# Patient Record
Sex: FEMALE | Race: OTHER | HISPANIC OR LATINO | URBAN - METROPOLITAN AREA
[De-identification: names, ages, dates, MRNs, and addresses within clinical notes are randomized per-mention and may not be internally consistent; named-entity substitution may affect disease eponyms.]

---

## 2020-03-29 ENCOUNTER — EMERGENCY (EMERGENCY)
Facility: HOSPITAL | Age: 25
LOS: 1 days | Discharge: HOME | End: 2020-03-29
Attending: STUDENT IN AN ORGANIZED HEALTH CARE EDUCATION/TRAINING PROGRAM
Payer: MEDICAID

## 2020-03-29 VITALS
SYSTOLIC BLOOD PRESSURE: 109 MMHG | RESPIRATION RATE: 20 BRPM | DIASTOLIC BLOOD PRESSURE: 69 MMHG | HEART RATE: 109 BPM | OXYGEN SATURATION: 98 % | HEIGHT: 63 IN | WEIGHT: 134.04 LBS | TEMPERATURE: 99 F

## 2020-03-29 LAB — HCG UR QL: NEGATIVE — SIGNIFICANT CHANGE UP

## 2020-03-29 PROCEDURE — 99284 EMERGENCY DEPT VISIT MOD MDM: CPT

## 2020-03-29 PROCEDURE — 99283 EMERGENCY DEPT VISIT LOW MDM: CPT | Mod: 25

## 2020-03-29 PROCEDURE — 96372 THER/PROPH/DIAG INJ SC/IM: CPT

## 2020-03-29 PROCEDURE — 81025 URINE PREGNANCY TEST: CPT

## 2020-03-29 RX ORDER — LIDOCAINE 4 G/100G
1 CREAM TOPICAL ONCE
Refills: 0 | Status: COMPLETED | OUTPATIENT
Start: 2020-03-29 | End: 2020-03-29

## 2020-03-29 RX ORDER — METHOCARBAMOL 500 MG/1
1500 TABLET, FILM COATED ORAL ONCE
Refills: 0 | Status: COMPLETED | OUTPATIENT
Start: 2020-03-29 | End: 2020-03-29

## 2020-03-29 RX ORDER — KETOROLAC TROMETHAMINE 30 MG/ML
30 SYRINGE (ML) INJECTION ONCE
Refills: 0 | Status: DISCONTINUED | OUTPATIENT
Start: 2020-03-29 | End: 2020-03-29

## 2020-03-29 RX ADMIN — METHOCARBAMOL 1500 MILLIGRAM(S): 500 TABLET, FILM COATED ORAL at 15:45

## 2020-03-29 RX ADMIN — Medication 30 MILLIGRAM(S): at 15:44

## 2020-03-29 RX ADMIN — LIDOCAINE 1 PATCH: 4 CREAM TOPICAL at 15:44

## 2020-03-29 NOTE — ED PROVIDER NOTE - NS ED ROS FT
+ right sided lower back pain radiating down right leg   - abdominal pain   - changes in bowel movements   - dysuria

## 2020-03-29 NOTE — ED PROVIDER NOTE - OBJECTIVE STATEMENT
25 yo female on depo shot presenting to the ER with worsening lower back pain that radiates down her leg to her foot. states that she has been taking ibu with minimal relief of symptoms. states that walking increases pain. pain worse sometimes in the morning or sometimes at night. denies accidents or injuries no hx of back injuries. no bladder or bowel incontinence. denies dysuria hematuria or saddle anesthesia. patient denies abdominal pain nausea vomiting diarrhea or changes to bowel movements. mild uri symptoms no fevers at home.

## 2020-03-29 NOTE — ED PROVIDER NOTE - CLINICAL SUMMARY MEDICAL DECISION MAKING FREE TEXT BOX
female with lower back pain atraumatic radiating down the right side ambulatory with normal gait   meds dc with spine fu

## 2020-03-29 NOTE — ED PROVIDER NOTE - PHYSICAL EXAMINATION
nontoxic appearing female no apparent respiratory or physical distress   heart rrr no obvious murmurs. no calf tenderness bilaterally    lungs cta   abd soft nd nttp + healed scar to the right lower quadrants   spine no midline pt vertebral or step offs + bilateral lower paraspinal muscle tenderness skin without erythema bruising or lesions. + TTP over right gluteal muscle   able to straight leg raise and hold bilaterally ambulatory with normal gait

## 2020-03-29 NOTE — ED PROVIDER NOTE - PATIENT PORTAL LINK FT
You can access the FollowMyHealth Patient Portal offered by [facility] by registering at the following website: [email or web address]. By joining [employer]’s FollowMyHealth portal, you will also be able to view your health information using other applications (apps) compatible with our system.

## 2020-03-29 NOTE — ED PROVIDER NOTE - ATTENDING CONTRIBUTION TO CARE
I performed a face to face history and physical exam of the patient and discussed their management with the resident/ACP. I reviewed the resident/ACP's note and agree with the documented findings and plan of care.    Pt with low back pain radiating down leg.  no numbness/weakness. no urinary incontinence.  treated for sciatica. will d/c with outpatient f/up.

## 2020-03-29 NOTE — ED PROVIDER NOTE - NSFOLLOWUPCLINICS_GEN_ALL_ED_FT
[facility] - [facility]  Ortho/Spine  [address] [place] [id number]  Phone: [phone]  Fax:   Follow Up Time:

## 2023-02-01 ENCOUNTER — APPOINTMENT (OUTPATIENT)
Dept: ANTEPARTUM | Facility: CLINIC | Age: 28
End: 2023-02-01

## 2023-02-01 PROBLEM — Z00.00 ENCOUNTER FOR PREVENTIVE HEALTH EXAMINATION: Status: ACTIVE | Noted: 2023-02-01

## 2023-02-08 ENCOUNTER — ASOB RESULT (OUTPATIENT)
Age: 28
End: 2023-02-08

## 2023-02-08 ENCOUNTER — APPOINTMENT (OUTPATIENT)
Dept: ANTEPARTUM | Facility: CLINIC | Age: 28
End: 2023-02-08
Payer: MEDICAID

## 2023-02-08 PROCEDURE — 76830 TRANSVAGINAL US NON-OB: CPT

## 2023-02-08 PROCEDURE — 76856 US EXAM PELVIC COMPLETE: CPT | Mod: 59

## 2023-07-17 ENCOUNTER — ASOB RESULT (OUTPATIENT)
Age: 28
End: 2023-07-17

## 2023-07-17 ENCOUNTER — APPOINTMENT (OUTPATIENT)
Dept: ANTEPARTUM | Facility: CLINIC | Age: 28
End: 2023-07-17
Payer: MEDICAID

## 2023-07-17 PROCEDURE — 76813 OB US NUCHAL MEAS 1 GEST: CPT
